# Patient Record
Sex: FEMALE | Race: OTHER | Employment: OTHER | URBAN - METROPOLITAN AREA
[De-identification: names, ages, dates, MRNs, and addresses within clinical notes are randomized per-mention and may not be internally consistent; named-entity substitution may affect disease eponyms.]

---

## 2017-06-02 NOTE — PATIENT DISCUSSION
HYPERTENSIVE RETINOPATHY OU: IMPORTANCE OF GOOD BLOOD PRESSURE CONTROL STRESSED. KEEP FU WITH PCP AND AS SCHEDULED.

## 2018-12-17 ENCOUNTER — PREPPED CHART (OUTPATIENT)
Dept: URBAN - METROPOLITAN AREA CLINIC 39 | Facility: CLINIC | Age: 68
End: 2018-12-17

## 2021-10-22 NOTE — PATIENT DISCUSSION
The patient has hypertensive retinopathy, a disorder of the retina resulting from damaged retinal blood vessels secondary to high blood pressure. Most cases require no treatment other than to control the underlying hypertension. Diagnosis, treatment and follow-up recommendations have been discussed with the patient.

## 2021-10-22 NOTE — PATIENT DISCUSSION
Mild dry eyes: Prescribed artificial tears BID-QID OU.  Return for follow-up as scheduled or sooner if symptoms persist.

## 2022-12-06 NOTE — PATIENT DISCUSSION
Stable. Symptoms of Retinal tear and detachment reviewed. Patient understands to call immediately with any such symptoms.

## 2023-01-13 NOTE — PATIENT DISCUSSION
The patient has hypertensive retinopathy, a disorder of the retina resulting from damaged retinal blood vessels secondary to high blood pressure. Most cases require no treatment other than to control the underlying hypertension. Diagnosis, treatment and follow-up recommendations have been discussed with the patient. Bill For Surgical Tray: no Billing Type: Third-Party Bill Performing Laboratory: [id number]